# Patient Record
Sex: FEMALE | Race: WHITE | NOT HISPANIC OR LATINO | Employment: FULL TIME | URBAN - METROPOLITAN AREA
[De-identification: names, ages, dates, MRNs, and addresses within clinical notes are randomized per-mention and may not be internally consistent; named-entity substitution may affect disease eponyms.]

---

## 2023-10-30 DIAGNOSIS — E10.9 TYPE 1 DIABETES MELLITUS WITHOUT COMPLICATION (MULTI): ICD-10-CM

## 2023-10-30 RX ORDER — INSULIN PMP CART,AUT,G6/7,CNTR
EACH SUBCUTANEOUS
COMMUNITY
Start: 2023-07-12 | End: 2023-10-30 | Stop reason: SDUPTHER

## 2023-10-30 RX ORDER — INSULIN PMP CART,AUT,G6/7,CNTR
1 EACH SUBCUTANEOUS CONTINUOUS
Qty: 10 EACH | Refills: 11 | Status: SHIPPED | OUTPATIENT
Start: 2023-10-30 | End: 2023-12-28 | Stop reason: SDUPTHER

## 2023-10-30 NOTE — TELEPHONE ENCOUNTER
Maribel Meadows     Contacted office for new script for Omnipod to be sent to [facility], patient's current pharmacy is closing . Patient is also asking what dose of Lantus should she use in th event any of her remaining her Omnipod malfunctions while she is out of town . Orders pended to provider. Encounter routed to provider . Nikki Mae LPN

## 2023-10-31 DIAGNOSIS — E10.9 TYPE 1 DIABETES MELLITUS WITHOUT COMPLICATION (MULTI): ICD-10-CM

## 2023-10-31 RX ORDER — BLOOD-GLUCOSE METER
KIT MISCELLANEOUS
Qty: 500 STRIP | Refills: 3 | Status: SHIPPED | OUTPATIENT
Start: 2023-10-31

## 2023-10-31 RX ORDER — BLOOD-GLUCOSE METER
KIT MISCELLANEOUS
COMMUNITY
End: 2023-10-31 | Stop reason: SDUPTHER

## 2023-10-31 NOTE — TELEPHONE ENCOUNTER
Email received from Maribel Meadows  Requesting refill of Freestyle , test strips . Order pended to provider . Nikki Mae LPN

## 2023-12-28 ENCOUNTER — TELEMEDICINE (OUTPATIENT)
Dept: ENDOCRINOLOGY | Facility: CLINIC | Age: 28
End: 2023-12-28
Payer: COMMERCIAL

## 2023-12-28 ENCOUNTER — TELEPHONE (OUTPATIENT)
Dept: ENDOCRINOLOGY | Facility: CLINIC | Age: 28
End: 2023-12-28

## 2023-12-28 ENCOUNTER — APPOINTMENT (OUTPATIENT)
Dept: ENDOCRINOLOGY | Facility: CLINIC | Age: 28
End: 2023-12-28
Payer: COMMERCIAL

## 2023-12-28 VITALS — BODY MASS INDEX: 34.39 KG/M2 | HEIGHT: 66 IN | WEIGHT: 214 LBS

## 2023-12-28 DIAGNOSIS — Z46.81 INSULIN PUMP TITRATION: ICD-10-CM

## 2023-12-28 DIAGNOSIS — E03.8 HYPOTHYROIDISM DUE TO HASHIMOTO'S THYROIDITIS: ICD-10-CM

## 2023-12-28 DIAGNOSIS — E06.3 HYPOTHYROIDISM DUE TO HASHIMOTO'S THYROIDITIS: ICD-10-CM

## 2023-12-28 DIAGNOSIS — E10.9 TYPE 1 DIABETES MELLITUS WITHOUT COMPLICATION (MULTI): Primary | ICD-10-CM

## 2023-12-28 PROCEDURE — 95251 CONT GLUC MNTR ANALYSIS I&R: CPT | Performed by: INTERNAL MEDICINE

## 2023-12-28 PROCEDURE — 99214 OFFICE O/P EST MOD 30 MIN: CPT | Performed by: INTERNAL MEDICINE

## 2023-12-28 RX ORDER — INSULIN PMP CART,AUT,G6/7,CNTR
EACH SUBCUTANEOUS
COMMUNITY
Start: 2022-12-29

## 2023-12-28 RX ORDER — SPIRONOLACTONE 100 MG/1
100 TABLET, FILM COATED ORAL DAILY
COMMUNITY

## 2023-12-28 RX ORDER — INSULIN PMP CART,AUT,G6/7,CNTR
1 EACH SUBCUTANEOUS CONTINUOUS
Qty: 45 EACH | Refills: 3 | Status: SHIPPED | OUTPATIENT
Start: 2023-12-28 | End: 2024-02-16 | Stop reason: SDUPTHER

## 2023-12-28 RX ORDER — FAMOTIDINE 40 MG/1
40 TABLET, FILM COATED ORAL 2 TIMES DAILY PRN
COMMUNITY
Start: 2022-01-20

## 2023-12-28 RX ORDER — FLUTICASONE PROPIONATE 50 MCG
1 SPRAY, SUSPENSION (ML) NASAL DAILY
COMMUNITY
Start: 2021-03-01

## 2023-12-28 RX ORDER — LEVOTHYROXINE SODIUM 50 UG/1
50 TABLET ORAL DAILY
COMMUNITY
End: 2023-12-28 | Stop reason: SDUPTHER

## 2023-12-28 RX ORDER — BLOOD-GLUCOSE SENSOR
EACH MISCELLANEOUS
COMMUNITY
Start: 2023-12-21 | End: 2024-02-16 | Stop reason: SDUPTHER

## 2023-12-28 RX ORDER — SERTRALINE HYDROCHLORIDE 25 MG/1
25 TABLET, FILM COATED ORAL
COMMUNITY
Start: 2021-04-15

## 2023-12-28 RX ORDER — LEVOTHYROXINE SODIUM 50 UG/1
50 TABLET ORAL DAILY
Qty: 90 TABLET | Refills: 3 | Status: SHIPPED | OUTPATIENT
Start: 2023-12-28

## 2023-12-28 RX ORDER — NORETHINDRONE ACETATE AND ETHINYL ESTRADIOL, AND FERROUS FUMARATE 1MG-20(24)
1 KIT ORAL DAILY
COMMUNITY

## 2023-12-28 RX ORDER — INSULIN LISPRO 100 [IU]/ML
INJECTION, SOLUTION INTRAVENOUS; SUBCUTANEOUS
Qty: 90 ML | Refills: 3 | Status: SHIPPED | OUTPATIENT
Start: 2023-12-28

## 2023-12-28 RX ORDER — BLOOD-GLUCOSE TRANSMITTER
EACH MISCELLANEOUS
COMMUNITY
Start: 2023-11-09

## 2023-12-28 RX ORDER — INSULIN LISPRO 100 [IU]/ML
INJECTION, SOLUTION INTRAVENOUS; SUBCUTANEOUS
COMMUNITY
Start: 2023-11-20 | End: 2023-12-28 | Stop reason: SDUPTHER

## 2023-12-28 NOTE — TELEPHONE ENCOUNTER
"Called pt to discuss adjusting the following pump settings:     -Turn reverse correction off  -Change active insulin time from 3.5 hours to 2.5 hours, uses \"use sensor\" button for corrections  -currently using activity mode 29% of time, refrain from using activity mode unless actually exercising because it is very strong in omnipod 5   -discussed omnipod 5 does not use pre-programmed basal rates when running in auto mode so no need to worry about adjusting basal      Reviewed with pt, she changed the settings on her pump while we were on the phone, all questions answered.       "

## 2023-12-28 NOTE — PROGRESS NOTES
"    Subjective   Maribel Meadows is a 28 y.o. female who presents for  evaluation of Type 1 diabetes mellitus.        switched to OmniPod from multiple daily injections  She is frustrated as she feels she is doing everything and her Bg are still not at goal  Hba1c estimated per dexcom is 7.9%    Prior was  A1c is 8.5    she travels back and forth between [place] and [place],   She is currently in [place]  Also she ha snew feelings of anxiety, in her chest   Happens with wide fluctuation of BG     hypoglycemia - not severe, seldom   on her birth control      ophthalmology 2/2023, her next appointment is coming up in 3/2024, no retinopathy    Alb/creat 3/2023 no nephropathy  No neuropathy        Working with   Ophthalmology  has appointment 3/2024 her yearly   Albumin/creat 3/2023       the following settings:   ISF: 12a-12p = 1:40, 12p-8p = 1:50, 8p-12a = 1:40  ICR: 12a-4p = 1:4, 4p-8p = 1:5.5, 8p-12a = 1:4    is using exercise mode for AM exercise.     Known complications due to diabetes included none    Cardiovascular risk factors include diabetes mellitus. The patient is not on an ACE inhibitor or angiotensin II receptor blocker.  The patient has not been previously hospitalized due to diabetic ketoacidosis.     Current symptoms/problems include hyperglycemia. Her clinical course has fluctuated.     Current diabetes regimen is as follows: Iinsulin pump: Basal 1.2 units   I/C: ICR: 12a-4p = 1:4, 4p-8p = 1:5.5, 8p-12a = 1:4  Insulin on board:   ISF: ISF: 12a-12p = 1:40, 12p-8p = 1:50, 8p-12a = 1:40  TDD: 100 fluctuates     Target glucose: 100-120     The patient is currently checking the blood glucose via CGM times per day.    Patient is using: continuous glucose monitor    Hypoglycemia frequency: none  Hypoglycemia awareness: Yes     Exercise: daily   Meal panning: She is using carbohydrate counting.    Review of Systems    Objective   Ht 1.676 m (5' 6\")   Wt 97.1 kg (214 lb)   BMI 34.54 " kg/m²   Physical Exam    Lab Review      Health Maintenance:   Foot Exam: 2022  Eye Exam: 2/2023  Lipid Panel: 2023  Urine Albumin: 3/2023     Assessment/Plan   Diagnoses and all orders for this visit:  Type 1 diabetes mellitus without complication (CMS/HCC)  -     insulin lispro (HumaLOG) 100 unit/mL injection; USE UP  UNITS PER DAY VIA INSULIN PUMP  -     levothyroxine (Synthroid, Levoxyl) 50 mcg tablet; Take 1 tablet (50 mcg) by mouth once daily. as directed  -     Omnipod 5 G6 Pods, Gen 5, cartridge; Inject 1 Application under the skin continuously. Every two days due to skin reaction  -     CBC; Future  -     Hemoglobin A1C; Future  -     Albumin , Urine Random; Future  -     Comprehensive Metabolic Panel; Future  -     TSH with reflex to Free T4 if abnormal; Future  -     ECG Rhythm Interpretation and Report  Hypothyroidism due to Hashimoto's thyroiditis  Insulin pump titration      90 days reading 190  7.9 % HbA1C      She has type 1 diabetes mellitus       need to change it every 2 days  due to her site reaction and changing insulin efficiency   ;she has too many settings  I suspect the omnipod algorithm cannot adjust to these changes  I suggested my P Susana to call her and adjust the settings to one  In 2 weeks Janae will see her and go over them           She did see an ophthalmologist 2/2023 and there is no documented retinopathy  She has no known neuropathy  She has no known nephropathy  She does not have any coronary artery disease and never had a stroke  FH hypercoagulopathy -dad   Her TSH is also mildly elevated consistent with subclinical hypothyroidism, treatment improved due labs   i will order labs and have them mail to he r      All questions answered  there was no barriers to learning or understanding  patient asked very appropriate questions.    rtc in 6 months     Dr. Grant Laughlin  Professor of Medicine,[facility]  Director, [facility]  , system  operations

## 2023-12-28 NOTE — Clinical Note
Please arrange appointment with Janae second week in January , and every two weeks after for few times  Virtual is good

## 2024-01-04 ENCOUNTER — TELEPHONE (OUTPATIENT)
Dept: ENDOCRINOLOGY | Facility: CLINIC | Age: 29
End: 2024-01-04
Payer: COMMERCIAL

## 2024-01-04 NOTE — TELEPHONE ENCOUNTER
Contacted Maribel Meadows  To inform her that Prior Authorization for Omnipods has been granted and message has been left with pharmacy. Nikki Mae LPN

## 2024-01-15 ENCOUNTER — TELEMEDICINE CLINICAL SUPPORT (OUTPATIENT)
Dept: ENDOCRINOLOGY | Facility: CLINIC | Age: 29
End: 2024-01-15
Payer: COMMERCIAL

## 2024-01-15 DIAGNOSIS — E10.65 TYPE 1 DIABETES MELLITUS WITH HYPERGLYCEMIA (MULTI): Primary | ICD-10-CM

## 2024-01-15 PROCEDURE — 99211 OFF/OP EST MAY X REQ PHY/QHP: CPT | Performed by: NURSE PRACTITIONER

## 2024-01-15 PROCEDURE — 95251 CONT GLUC MNTR ANALYSIS I&R: CPT | Performed by: NURSE PRACTITIONER

## 2024-01-15 NOTE — PROGRESS NOTES
"Reason for Visit:  Maribel Meadows is a 28 y.o. female who presents for Initial DSME Visit    DSME - Global Assessment    Referring Provider: Grant Laughlin MD    What do you hope to gain from this diabetes education visit? How to optimize the Omnipod5 to work for me instead of me working against it.     Have you had diabetes education in the past?  No. Not in the past year with  [facility]  In Your words, what is Diabetes:   What Concerns you most about having diabetes: protecting the body for longevity. I don't freak out when I have one high sugar but I want to stay in range as long as possible to have a long life even with the disease.      Readiness to Learn: demonstrates willingness to learn and demonstrates ability to learn  Household Composition: living independently, with family. Lives with  and black Adelina cespedes.     Demographics:   Difficulties with: None  Highest Level of Education: College Graduate  Race/Ethnic Origin: White/  Occupation:  ,  at a local [employer] and [employer]     Type of Diabetes: Type 1  What year were you diagnosed with diabetes: 2009  Family History: Not type 1, family history of autoimmune diseases. Brother has RA. Provided [id number] resource to patient for her family members     Comorbidities/Chronic Complications: Hashimotos  , patient unsure if she has been screened for celiac at diagnosis or within 5 years after. Will recommend to Dr. Laughlin to order celiac screening with next labs as unable to find in chart     No results found for: \"[id number]\", \"HGBA1C\"    Health Utilization Past 12 Months:   Hospital Admissions: No.  ER Visits: No.  Primary Care Visits: Yes.  Last Eye Exam : 01/2024 . Patient will request they send us her results.   Podiatry : [facility]  Dentist : Last Visit: 10/2023 and Frequency:      Diabetes Self-Management Skills and Behaviors:   Do you exercise regularly?: Yes. 5+ times/week.  Physical " Activity : walking, biking/cycling, weight training, and resistance bands  Yes  How do you manage your diabetes when you are sick?: call doctor, drink more fluids, and test blood sugar more often    Diabetes Medications: insulin pump  Current Outpatient Medications   Medication Sig Dispense Refill    Dexcom G6 Sensor device USE AS DIRECTED      Dexcom G6 Transmitter device USE AS DIRECTED      famotidine (Pepcid) 40 mg tablet Take 1 tablet (40 mg) by mouth 2 times a day as needed.      fluticasone (Flonase) 50 mcg/actuation nasal spray Administer 1 spray into each nostril once daily.      FreeStyle Lite Strips strip Test bloood sugar 5 times daily 500 strip 3    insulin lispro (HumaLOG) 100 unit/mL injection USE UP  UNITS PER DAY VIA INSULIN PUMP 90 mL 3    levothyroxine (Synthroid, Levoxyl) 50 mcg tablet Take 1 tablet (50 mcg) by mouth once daily. as directed 90 tablet 3    loratadine 10 mg capsule Take 10 capsules by mouth if needed.      norethindrone-e.estradioL-iron 1 mg-20 mcg (24)/75 mg (4) capsule Take 1 capsule by mouth once daily.      Omnipod 5 G6 Intro Kit, Gen 5, cartridge       Omnipod 5 G6 Pods, Gen 5, cartridge Inject 1 Application under the skin continuously. Every two days due to skin reaction 45 each 3    sertraline (Zoloft) 25 mg tablet Take 1 tablet (25 mg) by mouth once daily.      spironolactone (Aldactone) 100 mg tablet Take 1 tablet (100 mg) by mouth once daily.       No current facility-administered medications for this visit.     Injection/Infusion Sites: arm(s) and thigh(s).  Some irritation/tenderness near sites on thighs when she removes her pods. Recommended to avoid that area and rotate to the other limb.     Monitorng: CGM: Dexcom G6 with Omnipod5 system    Acute Complications-Safety: carries glucose and carries identification    Hypoglycemia: shakiness/dizziness  Hypoglycemia Treatment: doesn't treat hypos any differently than before. Fingerstick checks if needed if dexcom is  accurate. Treats off of fingerstick and does either 15g glucose tabs or juicebox. Waits 15 min to recheck and has snack. Discussed how to review CGM graph on OP5 froylan to see if she has been suspended for a while and if she has insulin on board, to treat with less carbs if suspended      Reproductive/Currently Pregnant : No.  Do you use birth control? : Yes  Are you planning to become pregnant in the future? : Yes    Diabetes Assessment:   DM Interferes with other aspects of my life: agree.  My level of stress is high: agree.  I struggle with making changes in my life: disagree.  How do you handle stress: Try to meditate, or go on a walk. Try to problem solve and relieve stress before I problem solve. Talk to my  or a family member.   Most difficult part of managing DM: Uncertainty of diabetes-- uncertain of what glucose will do during exercise or going low or high in public.     Impression:  DSME Topics Covered During Visit:   A1c Review  Learning to [place] with Stress, Depression and Other Concerns  Being Physically Active  Review Glycemic Goals (CGM or SMBG)  Reviewed Hypoglycemia Signs/Symptoms/Tx Plan  Reviewed Hyperglycemia Signs/Symptoms/Tx Plan  Ways to Deal With Diabetes Symptoms  Glycemic Pattern Management    Reviewed Diabetes Technology: Omnipod5 AID system versus ControlIQ algorithms      DSME Topics for Follow-Up:   Glycemic Pattern Management  Pump start if patient decides to switch to tandem pump       Provider Impression: Patient is here today per Dr. Laughlin for review of OP5 patterns and to troubleshoot how to optimize using the system. Patient is very engaged her in care and pleasant. She has been wearing OP5 for about a year and was on MDI prior.     Patient reports the following:   -Changing pods q2 days to prevent sites from going bad, has enough supplies   -Insulin vials -- running near low  -Filling pods with full 200 units- recommended to fill with 150 for 2 days of insulin usage based  "on TDD + a little wiggle room   -Menstration-- knows that she goes low 2 days before period starts, hyperglycemia first day of perod  -recommended staying in automated with exercise activity mode ON for those two days  -manual and some slight temp basals if needed for the 1 day she runs high  -Also advised she can stay in automated mode during menstruation related hyperglycemia and give additional correction insulin doses as needed.   -Patient inquired about TandemX2 pump versus Omnipod5. Discussed differences in algorithm. Patient is interested in Tandem Mobi or TandemX2 with ControlIQ technology but wants to give Omnipod5 with new settings and ideas around menstruation a chance.     OP5 Assessment:  AGP (insert picture)                    Time in Range (TIR)  mg/dL  \"Target Range.\" 60 %  Time 54-69 mg/dl: 0%  Time <54 mg/dl: 0%  Time 181-250 mg/dl: 27%  Time >250 mg/dl: 13%  CGM Active (Time using CGM): 92%  Time in automated mode: 88%  Time in automated mode limited: 1%  Time in manual mode: 12%    Number of Diet Entries/Day? 10.5    What are their patterns of hyperglycemia and/or hypoglycemia?  Hyperglycemia: post-prandial sometimes, but also often has to take additional insulin after meals and is bolusing an avg of 11 times per day instead of 3-5!!   Hypoglycemia: None    Insulin usage/settings  Basal: 31.7 units/day, 49%  Bolus: 33.1 units/day, 51%  Total daily dose: 64.8 units/day    Current Settings (insert picture):           Recommended Changes  Target glucose: 110 (keep as is)   I:C Ratio 2492-4610 1:6, 9542-7787 1:5, 1018-7037 1:5  Correction Factor 7300-6513 1:27, 0248-3675 1:25, 2752-8108 1:27  Active insulin time: leave as is at 2.5  hrs  Reverse correction OFF [x] Already off, keep off  Other Correct above: change to 110, change basal for manual mode to match current insulin usage. Change to 1.3 units/hr (31.7 basal daily on avg/24 = 1.3)    Educational tips for using ControlIQ discussed: "   HYPERGLYCEMIA > 300 mg/dL for 2 hours or more? Check ketones first! If ketones are (mod/large on urine strip), give syringe injection and change infusion site. [x]  Bolus before eating, ideally 10-15 minutes before all meals and snacks. [x]  Do not override the bolus calculator: Correction bolus doses may be smaller than expected due to insulin on board from the adaptive basal rate.[x]  Give correction boluses for hyperglycemia: Tap Use CGM in bolus calculator to add glucose value and trend into bolus calculator.[]  Treat mild hypoglycemia with 5-10g carbs to avoid rebound hyperglycemia and WAIT 15 min before re-treating to give glucose time to rise. Insulin delivery will have been suspended, resulting in little insulin on board when hypoglycemia occurs.[x]  Wear Pod and CGM on same side of body so they don’t lose connection.[x]  Clear Delivery Restriction alarms immediately, troubleshoot hyper/hypo, confirm CGM accuracy and switch back to Automated Mode.[x]     Time: I personally spent a total of 60 minutes with the patient providing diabetes self-management education. Visit documentation will be sent electronically to referring provider.     Provider on site: Марина Gloria, CHEIKH Aguilar PhD, RN, BC-ADM, [facility]

## 2024-01-22 ENCOUNTER — TELEPHONE (OUTPATIENT)
Dept: ENDOCRINOLOGY | Facility: CLINIC | Age: 29
End: 2024-01-22
Payer: COMMERCIAL

## 2024-01-22 DIAGNOSIS — E10.9 TYPE 1 DIABETES MELLITUS WITHOUT COMPLICATION (MULTI): ICD-10-CM

## 2024-01-22 RX ORDER — INSULIN ASPART 100 [IU]/ML
INJECTION, SOLUTION INTRAVENOUS; SUBCUTANEOUS
Qty: 90 ML | Refills: 3 | Status: SHIPPED | OUTPATIENT
Start: 2024-01-22

## 2024-01-22 NOTE — TELEPHONE ENCOUNTER
Maribel Meadows  Requesting new insulin scripts for Novolog due to insurance no longer covering Humalog . New script pended to provider for consideration Nikki Mae LPN

## 2024-01-29 ENCOUNTER — TELEMEDICINE CLINICAL SUPPORT (OUTPATIENT)
Dept: ENDOCRINOLOGY | Facility: CLINIC | Age: 29
End: 2024-01-29
Payer: COMMERCIAL

## 2024-01-29 DIAGNOSIS — E10.9 TYPE 1 DIABETES MELLITUS WITHOUT COMPLICATION (MULTI): Primary | ICD-10-CM

## 2024-01-29 NOTE — PROGRESS NOTES
"Reason for Visit:  Maribel Meadows is a 28 y.o. female who presents for Follow-up DSME Visit per Dr. Laughlin.     Impression:  DSME Topics Covered During Visit:   A1c Review  Review Glycemic Goals (CGM or SMBG)  Reviewed Hypoglycemia Signs/Symptoms/Tx Plan  Reviewed Hyperglycemia Signs/Symptoms/Tx Plan  Glycemic Pattern Management    Reviewed Diabetes Technology: Omnipod5 troubleshooting, algorithm and [place] reports; TandemX2 and Mobi with ControlIQ infusion set types and sites and basics of algorithm      DSME Topics for Follow-Up:   Glycemic Pattern Management  Transition to Tandem insulin pump if patient desires in future     Provider Impression: Maribel is here today for DSME follow-up and Omnipod5 AID system troubleshooting.       OP5 Assessment:    AGP (insert picture)                Time in Range (TIR)  mg/dL  \"Target Range.\" 64 %  Time 54-69 mg/dl: 0%  Time <54 mg/dl: 0%  Time 181-250 mg/dl: 25%  Time >250 mg/dl: 11%  CGM Active (Time using CGM): 92.7%  Time in automated mode: 97%  Time in automated mode limited: 1%  Time in manual mode: 3%    Number of Diet Entries/Day? 11.9    What are their patterns of hyperglycemia and/or hypoglycemia?  Hyperglycemia: day 2 of pods, repeated corrections that do not bring down glucose   Hypoglycemia: none    Insulin usage/settings  Basal: 29.9 units/day, 46%  Bolus: 33.9 units/day, 54%  Total daily dose: 63.1 units/day    Current Settings (insert picture):               Recommended Changes  Target glucose: leave at 110 (correct above 110)  I:C Ratio Leave at 0000 1:6. 0800 1:5, 1300 1:6  Correction Factor 0000 1:25 0800 1:23 1300 1:25  Active insulin time Leave at 2.5 hrs  Reverse correction OFF   Other: see notes below    Educational tips for using ControlIQ discussed:   HYPERGLYCEMIA > 300 mg/dL for 2 hours or more? Check ketones first! If ketones are (mod/large on urine strip), give syringe injection and change infusion site. [x]  Bolus before eating, " ideally 10-15 minutes before all meals and snacks. [x]  Do not override the bolus calculator: Correction bolus doses may be smaller than expected due to insulin on board from the adaptive basal rate.[x]  Give correction boluses for hyperglycemia: Tap Use CGM in bolus calculator to add glucose value and trend into bolus calculator.[x]  Treat mild hypoglycemia with 5-10g carbs to avoid rebound hyperglycemia and WAIT 15 min before re-treating to give glucose time to rise. Insulin delivery will have been suspended, resulting in little insulin on board when hypoglycemia occurs.[x]  Wear Pod and CGM on same side of body so they don’t lose connection.[x]  Clear Delivery Restriction alarms immediately, troubleshoot hyper/hypo, confirm CGM accuracy and switch back to Automated Mode.[x]     Notes from visit:  Patient reports the changes that we made at the last visit have helped overall! Patient reports seeing she is in range a lot more than she was previously.   -Patient reports she is seeing a pattern that on the days she is supposed to change the pod, she is running a lot higher. She seems   -Concerned about running higher on second day of her pods.   -Getting irritation/red ring and raised bump when changing her pods, noticing wet area around the pod when she take sit off sometimes. Discussed how this could be leaking.   -Flonase is working well to help prevent irritation!   -Recommended to try securing pod cannula on with podpals to see if it reduces her insulin leaking.   -Period days: use activity mode on days when she drops lower. THEN on days she goes higher, take additional corrections if needed. Leave going to manual with new profile to last resort.   -Asked about thoughts on Tandem pump. Patient is still wanting to see if Omnipod works out. Patient will discuss with Dr. Laughlin at next visit next week.     Time: I personally spent a total of 30 minutes with the patient providing diabetes self-management  education. Visit documentation will be sent electronically to referring provider.     Provider on site: CHEIKH Davis PhD, RN, BC-ADM, [facility]

## 2024-02-05 ENCOUNTER — TELEPHONE (OUTPATIENT)
Dept: ENDOCRINOLOGY | Facility: CLINIC | Age: 29
End: 2024-02-05
Payer: COMMERCIAL

## 2024-02-06 ENCOUNTER — TELEMEDICINE (OUTPATIENT)
Dept: ENDOCRINOLOGY | Facility: CLINIC | Age: 29
End: 2024-02-06
Payer: COMMERCIAL

## 2024-02-06 DIAGNOSIS — E06.3 HYPOTHYROIDISM DUE TO HASHIMOTO'S THYROIDITIS: ICD-10-CM

## 2024-02-06 DIAGNOSIS — E10.65 TYPE 1 DIABETES MELLITUS WITH HYPERGLYCEMIA (MULTI): Primary | ICD-10-CM

## 2024-02-06 DIAGNOSIS — Z46.81 INSULIN PUMP TITRATION: ICD-10-CM

## 2024-02-06 DIAGNOSIS — E03.8 HYPOTHYROIDISM DUE TO HASHIMOTO'S THYROIDITIS: ICD-10-CM

## 2024-02-06 PROCEDURE — 99214 OFFICE O/P EST MOD 30 MIN: CPT | Performed by: INTERNAL MEDICINE

## 2024-02-06 PROCEDURE — 95251 CONT GLUC MNTR ANALYSIS I&R: CPT | Performed by: INTERNAL MEDICINE

## 2024-02-06 PROCEDURE — 1036F TOBACCO NON-USER: CPT | Performed by: INTERNAL MEDICINE

## 2024-02-06 RX ORDER — AZITHROMYCIN 250 MG/1
TABLET, FILM COATED ORAL
Qty: 6 TABLET | Refills: 0 | Status: SHIPPED | OUTPATIENT
Start: 2024-02-06

## 2024-02-06 NOTE — PROGRESS NOTES
Consults    Reason For Consult  Type 1 diabetes mellitus     History Of Present Illness  Maribel Meadows is a 28 y.o. female presenting with type 1 diabetes.     Recently switched to OmniPod from multiple daily injections  Working with educator   Her hemoglobin A1c is 8.5 but GMI 7.3%     she travels back and forth between [place] and [place],      hypoglycemia - not severe, seldom   on her birth control     ophthalmology 2/2024 under media ,  ophthalmology - no retinopathy (one small spot hemorrhage    alb/creat 3/2023           Any family history of thyroid cancer? no  Any personal history of radiation to your head/neck? no    Past Medical History  She has a past medical history of Hypothyroidism, unspecified (12/07/2022) and Type 1 diabetes mellitus without complications (CMS/HCC) (12/07/2022).    Surgical History  She has a past surgical history that includes Other surgical history (06/01/2021) and Other surgical history (06/01/2021).     Social History  She reports that she has never smoked. She has never used smokeless tobacco. She reports current alcohol use of about 1.0 standard drink of alcohol per week. She reports that she does not use drugs.    Family History  Family History   Problem Relation Name Age of Onset    Anxiety disorder Mother      Stroke Father      Clotting disorder Father      Rheum arthritis Brother      Thyroid disease Other          Allergies  Cefaclor, Penicillins, and Sulfa (sulfonamide antibiotics)    Review of Systems    Past Medical History:   Diagnosis Date    Hypothyroidism, unspecified 12/07/2022    Acquired hypothyroidism    Type 1 diabetes mellitus without complications (CMS/HCC) 12/07/2022    Diabetes mellitus type 1       Past Surgical History:   Procedure Laterality Date    OTHER SURGICAL HISTORY  06/01/2021    Back surgery    OTHER SURGICAL HISTORY  06/01/2021    [place] tooth extraction       Social History     Socioeconomic History    Marital status:   "    Spouse name: Not on file    Number of children: Not on file    Years of education: Not on file    Highest education level: Not on file   Occupational History    Not on file   Tobacco Use    Smoking status: Never    Smokeless tobacco: Never   Substance and Sexual Activity    Alcohol use: Yes     Alcohol/week: 1.0 standard drink of alcohol     Types: 1 Glasses of wine per week    Drug use: Never    Sexual activity: Yes     Partners: Male     Birth control/protection: Other   Other Topics Concern    Not on file   Social History Narrative    Not on file     Social Determinants of Health     Financial Resource Strain: Not on file   Food Insecurity: Not on file   Transportation Needs: Not on file   Physical Activity: Not on file   Stress: Not on file   Social Connections: Not on file   Intimate Partner Violence: Not on file   Housing Stability: Not on file        Physical Exam     ROS, PMH, FH/SH, surgical history and allergies have been reviewed.    Last Recorded Vitals  There were no vitals taken for this visit.    Relevant Results         If you would like to pull in Medications, type .meds     If you would like to pull in Lab results for the last 24 hours, type .[id number]    If you would like to pull in specific Lab results, type .ll     If you would like to pull in Imaging results, type .imgrslt :99}  Lab Review  No results found for: \"BILITOT\", \"CALCIUM\", \"CO2\", \"CL\", \"CREATININE\", \"GLUCOSE\", \"ALKPHOS\", \"K\", \"PROT\", \"NA\", \"AST\", \"ALT\", \"BUN\", \"ANIONGAP\", \"MG\", \"PHOS\", \"GGT\", \"LDH\", \"ALBUMIN\", \"AMYLASE\", \"LIPASE\", \"GFRF\", \"GFRMALE\"  No results found for: \"TRIG\", \"CHOL\", \"LDLCALC\", \"HDL\"  No results found for: \"HGBA1C\"  The ASCVD Risk score (Willy MYERS, et al., 2019) failed to calculate for the following reasons:    The 2019 ASCVD risk score is only valid for ages 40 to 79       Assessment/Plan   Problem List Items Addressed This Visit             ICD-10-CM    Type 1 diabetes mellitus with hyperglycemia (CMS/HCC) - " Primary E10.65    Insulin pump titration Z46.81    Hypothyroidism due to Hashimoto's thyroiditis E03.8, E06.3            7.3 % GMI      Type 1 diabetes   Hypoglycemia    reviewed together her pump settings we did adjust some of her rates for example adjusted to sensitivity at night so that she does not go that low and in just a sensitivity in the afternoon so that she does not go that high.      7/2023 foot exam          She did see an ophthalmologist 2/2024  and there is no  retinopathy  She has no known neuropathy  She has no known nephropathy  She does not have any coronary artery disease and never had a stroke  Her TSH was mildly elevated consistent with subclinical hypothyroidism, treatment improved   She has type 1 diabetes mellitus         need to change it every 2 days  due to her site reaction and changing insulin efficiency   ;she has too many settings  I suspect the omnipod algorithm cannot adjust to these changes        FH hypercoagulopathy -dad                        I spent a total of 30+ minutes on the date of the service which included preparing to see the patient, face-to-face patient care, completing clinical documentation, obtaining and / or reviewing separately obtained history, counseling and educating the patient/family/caregiver, ordering medications, tests, or procedures, communicating with other healthcare providers (not separately reported), independently interpreting results, not separately reported, and communicating results to the patient/family/caregiver, real-time telemedicine visit using audiovisual technology with patient's verbal consent.  Name and date of birth verified.    Grant Laughlin MD

## 2024-02-12 ENCOUNTER — APPOINTMENT (OUTPATIENT)
Dept: ENDOCRINOLOGY | Facility: CLINIC | Age: 29
End: 2024-02-12
Payer: COMMERCIAL

## 2024-02-16 ENCOUNTER — PATIENT MESSAGE (OUTPATIENT)
Dept: ENDOCRINOLOGY | Facility: CLINIC | Age: 29
End: 2024-02-16
Payer: COMMERCIAL

## 2024-02-16 DIAGNOSIS — E10.9 TYPE 1 DIABETES MELLITUS WITHOUT COMPLICATION (MULTI): ICD-10-CM

## 2024-02-16 DIAGNOSIS — E10.65 TYPE 1 DIABETES MELLITUS WITH HYPERGLYCEMIA (MULTI): Primary | ICD-10-CM

## 2024-02-16 RX ORDER — BLOOD-GLUCOSE SENSOR
EACH MISCELLANEOUS
Qty: 9 EACH | Refills: 3 | Status: SHIPPED | OUTPATIENT
Start: 2024-02-16

## 2024-02-16 RX ORDER — INSULIN GLARGINE 100 [IU]/ML
84 INJECTION, SOLUTION SUBCUTANEOUS EVERY 24 HOURS
Qty: 80 ML | Refills: 3 | Status: SHIPPED | OUTPATIENT
Start: 2024-02-16 | End: 2024-02-20 | Stop reason: ALTCHOICE

## 2024-02-16 RX ORDER — INSULIN PMP CART,AUT,G6/7,CNTR
1 EACH SUBCUTANEOUS CONTINUOUS
Qty: 45 EACH | Refills: 3 | Status: SHIPPED | OUTPATIENT
Start: 2024-02-16

## 2024-02-16 NOTE — TELEPHONE ENCOUNTER
Patient requesting refill of Dexcom and Lantus. Order for Dexcom pended to provider and message concerning Lantus forwarded to provider. Nikki Mae LPN

## 2024-02-16 NOTE — TELEPHONE ENCOUNTER
Patient requesting updated script for Omnipod . She is requesting 15 pods per 30 instead of 10. Insurance contacted and 15 pods per 30 is approved . Script pended to provider. Nikki Mae LPN

## 2024-02-19 ENCOUNTER — TELEPHONE (OUTPATIENT)
Dept: ENDOCRINOLOGY | Facility: CLINIC | Age: 29
End: 2024-02-19
Payer: COMMERCIAL

## 2024-02-19 NOTE — TELEPHONE ENCOUNTER
Prior Auth for dexcom G6 pending determination  Initiated on 2/16 by epic system  Submitted on 2/19 via epic    Prior Auth for omnipod pods pending determination  Initated on 2/16 by epic system  Submitted on 2/19 via epic

## 2024-02-20 ENCOUNTER — PATIENT MESSAGE (OUTPATIENT)
Dept: ENDOCRINOLOGY | Facility: CLINIC | Age: 29
End: 2024-02-20
Payer: COMMERCIAL

## 2024-02-20 DIAGNOSIS — E10.65 TYPE 1 DIABETES MELLITUS WITH HYPERGLYCEMIA (MULTI): Primary | ICD-10-CM

## 2024-02-20 RX ORDER — INSULIN GLARGINE 100 [IU]/ML
84 INJECTION, SOLUTION SUBCUTANEOUS NIGHTLY
Qty: 15 ML | Refills: 11 | Status: SHIPPED | OUTPATIENT
Start: 2024-02-20

## 2024-03-11 ENCOUNTER — APPOINTMENT (OUTPATIENT)
Dept: ENDOCRINOLOGY | Facility: CLINIC | Age: 29
End: 2024-03-11
Payer: COMMERCIAL

## 2024-03-12 ENCOUNTER — TELEPHONE (OUTPATIENT)
Dept: ENDOCRINOLOGY | Facility: CLINIC | Age: 29
End: 2024-03-12
Payer: COMMERCIAL

## 2024-03-12 NOTE — TELEPHONE ENCOUNTER
REHANN for CGM and Pump supplies sent on behalf of Maribel Medaows  To [facility] via fax [phone]. Nikki Mae LPN

## 2024-03-19 ENCOUNTER — TELEPHONE (OUTPATIENT)
Dept: ENDOCRINOLOGY | Facility: CLINIC | Age: 29
End: 2024-03-19
Payer: COMMERCIAL

## 2024-03-19 NOTE — TELEPHONE ENCOUNTER
Clinical notes sent by fax to [facility] at  [phone] as requested Nikki Mae LPN

## 2024-03-25 DIAGNOSIS — E10.9 TYPE 1 DIABETES MELLITUS WITHOUT COMPLICATION (MULTI): ICD-10-CM

## 2024-03-25 RX ORDER — SYRINGE,SAFETY WITH NEEDLE,1ML 25GX1"
1 SYRINGE (EA) MISCELLANEOUS AS NEEDED
Qty: 100 EACH | Refills: 11 | Status: SHIPPED | OUTPATIENT
Start: 2024-03-25 | End: 2024-04-24

## 2024-03-25 RX ORDER — SYRINGE,SAFETY WITH NEEDLE,1ML 25GX1"
SYRINGE (EA) MISCELLANEOUS AS NEEDED
COMMUNITY
End: 2024-03-25 | Stop reason: SDUPTHER

## 2024-04-18 DIAGNOSIS — E10.9 TYPE 1 DIABETES MELLITUS WITHOUT COMPLICATION (MULTI): ICD-10-CM

## 2024-04-19 RX ORDER — INSULIN ASPART 100 [IU]/ML
45 INJECTION, SOLUTION INTRAVENOUS; SUBCUTANEOUS
COMMUNITY
End: 2024-04-19 | Stop reason: SDUPTHER

## 2024-04-19 RX ORDER — INSULIN ASPART 100 [IU]/ML
45 INJECTION, SOLUTION INTRAVENOUS; SUBCUTANEOUS DAILY
Qty: 15 ML | Refills: 3 | Status: SHIPPED | OUTPATIENT
Start: 2024-04-19

## 2024-04-19 NOTE — TELEPHONE ENCOUNTER
Maribel Meadows requesting script for Novolog flex pen in stead of vials . Order pended to provider .  Nikki Mae LPN

## 2024-05-31 ENCOUNTER — TELEPHONE (OUTPATIENT)
Dept: ENDOCRINOLOGY | Facility: CLINIC | Age: 29
End: 2024-05-31
Payer: COMMERCIAL

## 2024-05-31 NOTE — TELEPHONE ENCOUNTER
Outside labs and EKG results  for Maribel Meadows received from [place]  . Results available under Media Tab  for provider to review. Nikki Mae LPN

## 2024-08-21 DIAGNOSIS — E10.9 TYPE 1 DIABETES MELLITUS WITHOUT COMPLICATION (MULTI): ICD-10-CM

## 2024-08-21 RX ORDER — INSULIN ASPART 100 [IU]/ML
INJECTION, SOLUTION INTRAVENOUS; SUBCUTANEOUS
Qty: 45 ML | Refills: 3 | Status: SHIPPED | OUTPATIENT
Start: 2024-08-21 | End: 2024-08-22 | Stop reason: SDUPTHER

## 2024-08-22 ENCOUNTER — PATIENT MESSAGE (OUTPATIENT)
Dept: ENDOCRINOLOGY | Facility: CLINIC | Age: 29
End: 2024-08-22
Payer: COMMERCIAL

## 2024-08-22 DIAGNOSIS — E10.9 TYPE 1 DIABETES MELLITUS WITHOUT COMPLICATION (MULTI): ICD-10-CM

## 2024-08-22 DIAGNOSIS — E10.65 TYPE 1 DIABETES MELLITUS WITH HYPERGLYCEMIA (MULTI): ICD-10-CM

## 2024-08-22 RX ORDER — INSULIN GLARGINE 100 [IU]/ML
84 INJECTION, SOLUTION SUBCUTANEOUS NIGHTLY
Qty: 27 EACH | Refills: 3 | Status: SHIPPED | OUTPATIENT
Start: 2024-08-22

## 2024-08-22 RX ORDER — INSULIN ASPART 100 [IU]/ML
INJECTION, SOLUTION INTRAVENOUS; SUBCUTANEOUS
Qty: 45 ML | Refills: 3 | Status: SHIPPED | OUTPATIENT
Start: 2024-08-22

## 2024-09-13 ENCOUNTER — PATIENT MESSAGE (OUTPATIENT)
Dept: ENDOCRINOLOGY | Facility: CLINIC | Age: 29
End: 2024-09-13
Payer: COMMERCIAL

## 2024-09-13 DIAGNOSIS — E10.9 TYPE 1 DIABETES MELLITUS WITHOUT COMPLICATION: ICD-10-CM

## 2024-09-16 DIAGNOSIS — E10.9 TYPE 1 DIABETES MELLITUS WITHOUT COMPLICATION: ICD-10-CM

## 2024-09-16 RX ORDER — BLOOD-GLUCOSE SENSOR
EACH MISCELLANEOUS
Qty: 9 EACH | Refills: 3 | Status: SHIPPED | OUTPATIENT
Start: 2024-09-16 | End: 2024-09-16 | Stop reason: SDUPTHER

## 2024-09-16 RX ORDER — BLOOD-GLUCOSE SENSOR
EACH MISCELLANEOUS
Qty: 3 EACH | Refills: 11 | Status: SHIPPED | OUTPATIENT
Start: 2024-09-16

## 2024-09-25 DIAGNOSIS — E10.65 TYPE 1 DIABETES MELLITUS WITH HYPERGLYCEMIA (MULTI): ICD-10-CM

## 2024-09-25 RX ORDER — BLOOD-GLUCOSE TRANSMITTER
EACH MISCELLANEOUS
Qty: 1 EACH | Refills: 11 | Status: SHIPPED | OUTPATIENT
Start: 2024-09-25

## 2024-10-24 ENCOUNTER — APPOINTMENT (OUTPATIENT)
Dept: ENDOCRINOLOGY | Facility: CLINIC | Age: 29
End: 2024-10-24
Payer: COMMERCIAL

## 2024-10-29 ENCOUNTER — TELEPHONE (OUTPATIENT)
Dept: ENDOCRINOLOGY | Facility: CLINIC | Age: 29
End: 2024-10-29
Payer: COMMERCIAL

## 2024-11-11 DIAGNOSIS — E10.9 TYPE 1 DIABETES MELLITUS WITHOUT COMPLICATION: ICD-10-CM

## 2024-11-11 RX ORDER — LEVOTHYROXINE SODIUM 50 UG/1
50 TABLET ORAL DAILY
Qty: 90 TABLET | Refills: 3 | Status: SHIPPED | OUTPATIENT
Start: 2024-11-11

## 2024-12-04 ENCOUNTER — PATIENT MESSAGE (OUTPATIENT)
Dept: ENDOCRINOLOGY | Facility: CLINIC | Age: 29
End: 2024-12-04
Payer: COMMERCIAL

## 2024-12-04 DIAGNOSIS — E10.65 TYPE 1 DIABETES MELLITUS WITH HYPERGLYCEMIA (MULTI): Primary | ICD-10-CM

## 2024-12-26 ENCOUNTER — TELEPHONE (OUTPATIENT)
Dept: ENDOCRINOLOGY | Facility: CLINIC | Age: 29
End: 2024-12-26

## 2024-12-26 ENCOUNTER — APPOINTMENT (OUTPATIENT)
Dept: ENDOCRINOLOGY | Facility: CLINIC | Age: 29
End: 2024-12-26
Payer: COMMERCIAL

## 2024-12-26 VITALS — WEIGHT: 215 LBS | BODY MASS INDEX: 34.55 KG/M2 | HEIGHT: 66 IN

## 2024-12-26 DIAGNOSIS — E10.9 TYPE 1 DIABETES MELLITUS WITHOUT COMPLICATION: Primary | ICD-10-CM

## 2024-12-26 DIAGNOSIS — Z46.81 INSULIN PUMP TITRATION: ICD-10-CM

## 2024-12-26 DIAGNOSIS — E06.3 HYPOTHYROIDISM DUE TO HASHIMOTO'S THYROIDITIS: ICD-10-CM

## 2024-12-26 PROCEDURE — 99214 OFFICE O/P EST MOD 30 MIN: CPT | Performed by: INTERNAL MEDICINE

## 2024-12-26 PROCEDURE — 3008F BODY MASS INDEX DOCD: CPT | Performed by: INTERNAL MEDICINE

## 2024-12-26 PROCEDURE — 4010F ACE/ARB THERAPY RXD/TAKEN: CPT | Performed by: INTERNAL MEDICINE

## 2024-12-26 PROCEDURE — 95251 CONT GLUC MNTR ANALYSIS I&R: CPT | Performed by: INTERNAL MEDICINE

## 2024-12-26 PROCEDURE — 1036F TOBACCO NON-USER: CPT | Performed by: INTERNAL MEDICINE

## 2024-12-26 RX ORDER — RAMIPRIL 5 MG/1
5 CAPSULE ORAL DAILY
Qty: 30 CAPSULE | Refills: 11 | Status: SHIPPED | OUTPATIENT
Start: 2024-12-26 | End: 2025-12-26

## 2024-12-26 ASSESSMENT — PATIENT HEALTH QUESTIONNAIRE - PHQ9
SUM OF ALL RESPONSES TO PHQ9 QUESTIONS 1 AND 2: 0
2. FEELING DOWN, DEPRESSED OR HOPELESS: NOT AT ALL
1. LITTLE INTEREST OR PLEASURE IN DOING THINGS: NOT AT ALL

## 2024-12-26 NOTE — Clinical Note
There was an order from 12/04 for her to see an endocrinologist near her, can you make sure it was faxed, I see Collette send it to you  Thank you very much

## 2024-12-26 NOTE — TELEPHONE ENCOUNTER
Order for FUV in endocrinology mailed 12/26/2024      ----- Message from Grant Laughlin sent at 12/26/2024  9:32 AM EST -----  There was an order from 12/04 for her to see an endocrinologist near her, can you make sure it was faxed, I see Collette send it to you   Thank you very much

## 2024-12-26 NOTE — PROGRESS NOTES
"Consults    Reason For Consult  Diabetes     History Of Present Illness  Maribel Meadows is a 29 y.o. female presenting with type 1 diabetes.   Today patient is at [facility] for [place]     Dexcom malfunction and illness increased blood sugars last few weeks    Off   OmniPod   Injection lantus   18 at night - 11 pm   8 units 7 am     Her hemoglobin A1c is 7.1 from May 2024   GMI 7.5%     she travels back and forth between [place] and [place],      hypoglycemia - pattern of early morning Low and morning high noted   on her birth control     ophthalmology 2/2024 under media ,  ophthalmology - no retinopathy (one small spot hemorrhage    alb/creat 5/2024 30, ?? Report is not clear           Home Management    Results from Most Recent A1C  No results found for: \"HGBA1C\"     Diabetes Problem List Entries with Dates  Problem List:  2024-02: Type 1 diabetes mellitus with hyperglycemia (Multi)               Any family history of thyroid cancer? no  Any personal history of radiation to your head/neck? no    Past Medical History  She has a past medical history of Hypothyroidism, unspecified (12/07/2022) and Type 1 diabetes mellitus without complications (12/07/2022).    Surgical History  She has a past surgical history that includes Other surgical history (06/01/2021) and Other surgical history (06/01/2021).     Social History  She reports that she has never smoked. She has never used smokeless tobacco. She reports current alcohol use of about 1.0 standard drink of alcohol per week. She reports that she does not use drugs.    Family History  Family History   Problem Relation Name Age of Onset    Anxiety disorder Mother      Stroke Father      Clotting disorder Father      Rheum arthritis Brother      Thyroid disease Other          Allergies  Cefaclor, Penicillins, and Sulfa (sulfonamide antibiotics)    Review of Systems    Past Medical History:   Diagnosis Date    " Hypothyroidism, unspecified 12/07/2022    Acquired hypothyroidism    Type 1 diabetes mellitus without complications 12/07/2022    Diabetes mellitus type 1       Past Surgical History:   Procedure Laterality Date    OTHER SURGICAL HISTORY  06/01/2021    Back surgery    OTHER SURGICAL HISTORY  06/01/2021    [place] tooth extraction       Social History     Socioeconomic History    Marital status:      Spouse name: Not on file    Number of children: Not on file    Years of education: Not on file    Highest education level: Not on file   Occupational History    Not on file   Tobacco Use    Smoking status: Never    Smokeless tobacco: Never   Substance and Sexual Activity    Alcohol use: Yes     Alcohol/week: 1.0 standard drink of alcohol     Types: 1 Glasses of wine per week    Drug use: Never    Sexual activity: Yes     Partners: Male     Birth control/protection: Other   Other Topics Concern    Not on file   Social History Narrative    Not on file     Social Drivers of Health     Financial Resource Strain: Not on file   Food Insecurity: Not on file   Transportation Needs: Not on file   Physical Activity: Not on file   Stress: Not on file   Social Connections: Unknown (5/3/2024)    Received from [employer]    Social Connections     Frequency of Communication with Friends and Family: Not asked     Frequency of Social Gatherings with Friends and Family: Not asked   Intimate Partner Violence: Unknown (5/3/2024)    Received from [employer]    Intimate Partner Violence     Fear of Current or Ex-Partner: Not asked     Emotionally Abused: Not asked     Physically Abused: Not asked     Sexually Abused: Not asked   Housing Stability: Not At Risk (3/10/2022)    Received from [employer], [facility]    Housing Stability     Was there a time when you did not have a steady place to sleep: Not asked     Worried that the place you are staying is making you sick: Not asked        Physical Exam     ROS, PMH, FH/SH,  "surgical history and allergies have been reviewed.    Last Recorded Vitals  Height 1.676 m (5' 6\"), weight 97.5 kg (215 lb), last menstrual period 12/05/2024.    Relevant Results         If you would like to pull in Medications, type .meds     If you would like to pull in Lab results for the last 24 hours, type .[id number]    If you would like to pull in specific Lab results, type .ll     If you would like to pull in Imaging results, type .imgrslt :99}  Reviewed      Assessment/Plan   Problem List Items Addressed This Visit             ICD-10-CM    Insulin pump titration Z46.81    Relevant Medications    ramipril (Altace) 5 mg capsule    Other Relevant Orders    Albumin-Creatinine Ratio, Urine Random    Hemoglobin A1C    TSH with reflex to Free T4 if abnormal    Hypothyroidism due to Hashimoto's thyroiditis E06.3    Relevant Medications    ramipril (Altace) 5 mg capsule    Other Relevant Orders    Albumin-Creatinine Ratio, Urine Random    Hemoglobin A1C    TSH with reflex to Free T4 if abnormal     Other Visit Diagnoses         Codes    Type 1 diabetes mellitus without complication    -  Primary E10.9    Relevant Medications    ramipril (Altace) 5 mg capsule    Other Relevant Orders    Albumin-Creatinine Ratio, Urine Random    Hemoglobin A1C    TSH with reflex to Free T4 if abnormal            We will decrease 16 units at night  So reactive hyper in am should be lower      To let me know in 5 days   If needed increase AM to 10 units   Repeat alb/creat ratio  Start ramipril   TSH acceptable 5/2024      I spent 30 minutes in the professional and overall care of this patient.      Grant Laughlin MD    "

## 2025-01-15 ENCOUNTER — PATIENT MESSAGE (OUTPATIENT)
Dept: ENDOCRINOLOGY | Facility: CLINIC | Age: 30
End: 2025-01-15
Payer: COMMERCIAL

## 2025-01-15 DIAGNOSIS — E10.9 TYPE 1 DIABETES MELLITUS WITHOUT COMPLICATION: Primary | ICD-10-CM

## 2025-01-21 ENCOUNTER — TELEPHONE (OUTPATIENT)
Dept: ENDOCRINOLOGY | Facility: CLINIC | Age: 30
End: 2025-01-21
Payer: COMMERCIAL

## 2025-01-21 NOTE — TELEPHONE ENCOUNTER
Received results from [facility] on 1/21/25  Collected on 1/15/25  Available for your review in media: 1/21/25 : patient record: labs

## 2025-01-26 DIAGNOSIS — E10.65 TYPE 1 DIABETES MELLITUS WITH HYPERGLYCEMIA (MULTI): ICD-10-CM

## 2025-01-27 RX ORDER — INSULIN GLARGINE 100 [IU]/ML
INJECTION, SOLUTION SUBCUTANEOUS
Qty: 30 ML | Refills: 3 | Status: SHIPPED | OUTPATIENT
Start: 2025-01-27

## 2025-01-30 ENCOUNTER — TELEPHONE (OUTPATIENT)
Dept: ENDOCRINOLOGY | Facility: CLINIC | Age: 30
End: 2025-01-30
Payer: COMMERCIAL

## 2025-01-30 NOTE — TELEPHONE ENCOUNTER
Prior Auth for dexcom sensor pending determination  Submitted on 1/30 via [facility]    KEY: [id number]

## 2025-02-04 NOTE — TELEPHONE ENCOUNTER
Eye Exam received and uploaded into patient record under media tab .  Nikki Mae LPN     Scanned signed auth to the new forms folder in disability

## 2025-04-15 ENCOUNTER — TELEPHONE (OUTPATIENT)
Dept: ENDOCRINOLOGY | Facility: CLINIC | Age: 30
End: 2025-04-15
Payer: COMMERCIAL

## 2025-04-15 DIAGNOSIS — E10.9 TYPE 1 DIABETES MELLITUS WITHOUT COMPLICATION: ICD-10-CM

## 2025-04-15 RX ORDER — BLOOD-GLUCOSE SENSOR
EACH MISCELLANEOUS
Qty: 3 EACH | Refills: 11 | Status: SHIPPED | OUTPATIENT
Start: 2025-04-15

## 2025-05-12 ENCOUNTER — TELEPHONE (OUTPATIENT)
Dept: ENDOCRINOLOGY | Facility: CLINIC | Age: 30
End: 2025-05-12
Payer: COMMERCIAL

## 2025-05-12 NOTE — TELEPHONE ENCOUNTER
[facility] sent over fax stating that the Dexcom G6 is on back order and needs to know what alternate you want.

## 2025-06-09 ENCOUNTER — PATIENT MESSAGE (OUTPATIENT)
Dept: ENDOCRINOLOGY | Facility: CLINIC | Age: 30
End: 2025-06-09
Payer: COMMERCIAL

## 2025-06-09 DIAGNOSIS — E10.9 TYPE 1 DIABETES MELLITUS WITHOUT COMPLICATION: ICD-10-CM

## 2025-06-09 DIAGNOSIS — E10.65 TYPE 1 DIABETES MELLITUS WITH HYPERGLYCEMIA (MULTI): ICD-10-CM

## 2025-06-09 RX ORDER — BLOOD-GLUCOSE SENSOR
EACH MISCELLANEOUS
Qty: 3 EACH | Refills: 11 | Status: SHIPPED | OUTPATIENT
Start: 2025-06-09

## 2025-06-09 RX ORDER — INSULIN GLARGINE-YFGN 100 [IU]/ML
INJECTION, SOLUTION SUBCUTANEOUS
Qty: 3 ML | Refills: 3 | Status: SHIPPED | OUTPATIENT
Start: 2025-06-09

## 2025-06-09 RX ORDER — LEVOTHYROXINE SODIUM 50 UG/1
50 TABLET ORAL DAILY
Qty: 90 TABLET | Refills: 1 | Status: SHIPPED | OUTPATIENT
Start: 2025-06-09

## 2025-06-09 RX ORDER — INSULIN LISPRO 100 [IU]/ML
INJECTION, SOLUTION INTRAVENOUS; SUBCUTANEOUS
Qty: 45 ML | Refills: 1 | Status: SHIPPED | OUTPATIENT
Start: 2025-06-09

## 2025-06-09 RX ORDER — INSULIN LISPRO 100 [IU]/ML
INJECTION, SOLUTION INTRAVENOUS; SUBCUTANEOUS
Qty: 90 ML | Refills: 3 | Status: SHIPPED | OUTPATIENT
Start: 2025-06-09

## 2025-06-09 RX ORDER — INSULIN GLARGINE-YFGN 100 [IU]/ML
INJECTION, SOLUTION SUBCUTANEOUS
Qty: 90 ML | Refills: 1 | Status: SHIPPED | OUTPATIENT
Start: 2025-06-09

## 2025-06-09 RX ORDER — LEVOTHYROXINE SODIUM 50 UG/1
50 TABLET ORAL DAILY
Qty: 90 TABLET | Refills: 3 | Status: SHIPPED | OUTPATIENT
Start: 2025-06-09

## 2025-06-10 ENCOUNTER — TELEPHONE (OUTPATIENT)
Dept: ENDOCRINOLOGY | Facility: CLINIC | Age: 30
End: 2025-06-10
Payer: COMMERCIAL

## 2025-06-10 NOTE — TELEPHONE ENCOUNTER
This nurse called patient to inform her [facility] needs clarification on if she is in need of insulin vials or pens. Patient stated she is not using the pump right now and would like pens. Stated providers are aware. Patient also encouraged to schedule a visit as she was last seen on 12/2024. Patients information sent to clerical staff for scheduling.

## 2025-07-03 ENCOUNTER — APPOINTMENT (OUTPATIENT)
Age: 30
End: 2025-07-03
Payer: COMMERCIAL

## 2025-07-23 DIAGNOSIS — E10.9 TYPE 1 DIABETES MELLITUS WITHOUT COMPLICATION: ICD-10-CM

## 2025-07-23 DIAGNOSIS — E10.65 TYPE 1 DIABETES MELLITUS WITH HYPERGLYCEMIA (MULTI): ICD-10-CM

## 2025-07-23 RX ORDER — BLOOD-GLUCOSE SENSOR
EACH MISCELLANEOUS
Qty: 3 EACH | Refills: 11 | Status: SHIPPED | OUTPATIENT
Start: 2025-07-23

## 2025-07-23 RX ORDER — BLOOD-GLUCOSE TRANSMITTER
EACH MISCELLANEOUS
Qty: 1 EACH | Refills: 11 | Status: SHIPPED | OUTPATIENT
Start: 2025-07-23

## 2025-07-25 ENCOUNTER — TELEPHONE (OUTPATIENT)
Dept: ENDOCRINOLOGY | Facility: CLINIC | Age: 30
End: 2025-07-25